# Patient Record
Sex: MALE | Race: BLACK OR AFRICAN AMERICAN | NOT HISPANIC OR LATINO | Employment: UNEMPLOYED | URBAN - METROPOLITAN AREA
[De-identification: names, ages, dates, MRNs, and addresses within clinical notes are randomized per-mention and may not be internally consistent; named-entity substitution may affect disease eponyms.]

---

## 2018-12-13 ENCOUNTER — TELEPHONE (OUTPATIENT)
Dept: NEUROLOGY | Facility: HOSPITAL | Age: 58
End: 2018-12-13

## 2018-12-20 ENCOUNTER — TELEPHONE (OUTPATIENT)
Dept: NEUROLOGY | Facility: HOSPITAL | Age: 58
End: 2018-12-20

## 2018-12-26 ENCOUNTER — TELEPHONE (OUTPATIENT)
Dept: NEUROLOGY | Facility: HOSPITAL | Age: 58
End: 2018-12-26

## 2019-01-14 ENCOUNTER — TELEPHONE (OUTPATIENT)
Dept: NEUROLOGY | Facility: HOSPITAL | Age: 59
End: 2019-01-14

## 2019-01-16 ENCOUNTER — TELEPHONE (OUTPATIENT)
Dept: NEUROLOGY | Facility: HOSPITAL | Age: 59
End: 2019-01-16

## 2019-01-16 NOTE — TELEPHONE ENCOUNTER
Pt unable to schedule clinic appt on Wednesday and procedure on following Thursday.  Pt cannot afford to stay in hotel overnight. Pt willing to make two trips to [place]. Pt request clinic appt and will schedule procedure after visit.  Clinic appt scheduled with pt on Monday, January 28, 2019 at 2pm.  Pt given clinic address and telephone number.  Pt and friend repeated date, time and address correctly.

## 2019-01-28 ENCOUNTER — OFFICE VISIT (OUTPATIENT)
Dept: NEUROLOGY | Facility: HOSPITAL | Age: 59
End: 2019-01-28
Attending: INTERNAL MEDICINE
Payer: MEDICAID

## 2019-01-28 VITALS
TEMPERATURE: 97 F | DIASTOLIC BLOOD PRESSURE: 74 MMHG | BODY MASS INDEX: 24.95 KG/M2 | SYSTOLIC BLOOD PRESSURE: 135 MMHG | WEIGHT: 178.25 LBS | HEART RATE: 78 BPM | HEIGHT: 71 IN

## 2019-01-28 DIAGNOSIS — D50.0 CHRONIC BLOOD LOSS ANEMIA: Primary | ICD-10-CM

## 2019-01-28 PROCEDURE — 99214 OFFICE O/P EST MOD 30 MIN: CPT | Performed by: INTERNAL MEDICINE

## 2019-01-28 RX ORDER — DIGOXIN 125 MCG
125 TABLET ORAL DAILY
COMMUNITY

## 2019-01-28 RX ORDER — LANOLIN ALCOHOL/MO/W.PET/CERES
100 CREAM (GRAM) TOPICAL
COMMUNITY
End: 2020-01-16 | Stop reason: ALTCHOICE

## 2019-01-28 RX ORDER — SPIRONOLACTONE 25 MG/1
25 TABLET ORAL DAILY
Status: ON HOLD | COMMUNITY
End: 2023-10-02 | Stop reason: HOSPADM

## 2019-01-28 RX ORDER — FERROUS SULFATE 325(65) MG
325 TABLET ORAL 2 TIMES DAILY
Status: ON HOLD | COMMUNITY
End: 2023-10-02 | Stop reason: HOSPADM

## 2019-01-28 RX ORDER — SUCRALFATE 1 G/1
1 TABLET ORAL 2 TIMES DAILY
Status: ON HOLD | COMMUNITY
End: 2023-10-02 | Stop reason: HOSPADM

## 2019-01-28 RX ORDER — FUROSEMIDE 40 MG/1
40 TABLET ORAL 2 TIMES DAILY
Status: ON HOLD | COMMUNITY
End: 2023-10-02 | Stop reason: SDUPTHER

## 2019-01-28 NOTE — PATIENT INSTRUCTIONS
You are scheduled for an Upper SBE on _Monday, February 4, 2019________________________________    You should eat light meals the day before the procedure and nothing to eat or drink after midnight the night before your procedure.    You will need to be at the 1st floor admission desk at the hospital on _7am arrival time._______________________
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
## 2019-01-31 ENCOUNTER — TELEPHONE (OUTPATIENT)
Dept: ENDOSCOPY | Facility: HOSPITAL | Age: 59
End: 2019-01-31

## 2019-02-01 ENCOUNTER — TELEPHONE (OUTPATIENT)
Dept: ENDOSCOPY | Facility: HOSPITAL | Age: 59
End: 2019-02-01

## 2019-02-01 NOTE — TELEPHONE ENCOUNTER
Spoke with patient about arrival time @ 0700*.     NPO status reviewed: Patient must have nothing to eat after midnight.      Medications: Do not take Insulin or oral diabetic medications the day of the procedure.  Take as prescribed: heart, seizure and blood pressure medication in the morning with a sip of water (less than an ounce).  Take any breathing medications and bring inhalers to hospital with you Leave all valuables and jewelry at home.     Wear comfortable clothes to procedure to change into hospital gown You cannot drive for 24 hours after your procedure because you will receive sedation for your procedure to make you comfortable.  A ride must be provided at discharge.

## 2019-02-04 ENCOUNTER — ANESTHESIA (OUTPATIENT)
Dept: ENDOSCOPY | Facility: HOSPITAL | Age: 59
End: 2019-02-04
Payer: MEDICAID

## 2019-02-04 ENCOUNTER — ANESTHESIA EVENT (OUTPATIENT)
Dept: ENDOSCOPY | Facility: HOSPITAL | Age: 59
End: 2019-02-04
Payer: MEDICAID

## 2019-02-04 ENCOUNTER — HOSPITAL ENCOUNTER (OUTPATIENT)
Facility: HOSPITAL | Age: 59
Discharge: HOME | End: 2019-02-04
Attending: INTERNAL MEDICINE | Admitting: INTERNAL MEDICINE
Payer: MEDICAID

## 2019-02-04 VITALS
WEIGHT: 176 LBS | RESPIRATION RATE: 20 BRPM | OXYGEN SATURATION: 100 % | HEIGHT: 71 IN | HEART RATE: 79 BPM | SYSTOLIC BLOOD PRESSURE: 123 MMHG | BODY MASS INDEX: 24.64 KG/M2 | DIASTOLIC BLOOD PRESSURE: 59 MMHG | TEMPERATURE: 98 F

## 2019-02-04 DIAGNOSIS — D64.9 ANEMIA, UNSPECIFIED TYPE: Primary | ICD-10-CM

## 2019-02-04 DIAGNOSIS — D64.9 ANEMIA: ICD-10-CM

## 2019-02-04 DIAGNOSIS — D50.0 ANEMIA DUE TO CHRONIC BLOOD LOSS: ICD-10-CM

## 2019-02-04 DIAGNOSIS — D51.0 PERNICIOUS ANEMIA: ICD-10-CM

## 2019-02-04 LAB
ALBUMIN SERPL BCP-MCNC: 3.5 G/DL
ALP SERPL-CCNC: 77 U/L
ALT SERPL W/O P-5'-P-CCNC: 44 U/L
ANION GAP SERPL CALC-SCNC: 7 MMOL/L
ANISOCYTOSIS BLD QL SMEAR: SLIGHT
AST SERPL-CCNC: 57 U/L
BASOPHILS # BLD AUTO: ABNORMAL K/UL
BASOPHILS NFR BLD: 0 %
BILIRUB SERPL-MCNC: 0.4 MG/DL
BUN SERPL-MCNC: 11 MG/DL
CALCIUM SERPL-MCNC: 8.6 MG/DL
CHLORIDE SERPL-SCNC: 110 MMOL/L
CO2 SERPL-SCNC: 24 MMOL/L
CREAT SERPL-MCNC: 1.2 MG/DL
DIFFERENTIAL METHOD: ABNORMAL
EOSINOPHIL # BLD AUTO: ABNORMAL K/UL
EOSINOPHIL NFR BLD: 1 %
ERYTHROCYTE [DISTWIDTH] IN BLOOD BY AUTOMATED COUNT: 15.7 %
EST. GFR  (AFRICAN AMERICAN): >60 ML/MIN/1.73 M^2
EST. GFR  (NON AFRICAN AMERICAN): >60 ML/MIN/1.73 M^2
FERRITIN SERPL-MCNC: 62 NG/ML
GLUCOSE SERPL-MCNC: 147 MG/DL
HCT VFR BLD AUTO: 33.2 %
HGB BLD-MCNC: 10 G/DL
HYPOCHROMIA BLD QL SMEAR: ABNORMAL
IRON SERPL-MCNC: 45 UG/DL
LYMPHOCYTES # BLD AUTO: ABNORMAL K/UL
LYMPHOCYTES NFR BLD: 27 %
MAGNESIUM SERPL-MCNC: 2.1 MG/DL
MCH RBC QN AUTO: 28.5 PG
MCHC RBC AUTO-ENTMCNC: 30.1 G/DL
MCV RBC AUTO: 95 FL
MONOCYTES # BLD AUTO: ABNORMAL K/UL
MONOCYTES NFR BLD: 8 %
NEUTROPHILS NFR BLD: 64 %
OVALOCYTES BLD QL SMEAR: ABNORMAL
PLATELET # BLD AUTO: 57 K/UL
PLATELET BLD QL SMEAR: ABNORMAL
PMV BLD AUTO: ABNORMAL FL
POIKILOCYTOSIS BLD QL SMEAR: SLIGHT
POTASSIUM SERPL-SCNC: 4.3 MMOL/L
PROT SERPL-MCNC: 6.7 G/DL
RBC # BLD AUTO: 3.51 M/UL
SATURATED IRON: 10 %
SODIUM SERPL-SCNC: 141 MMOL/L
TOTAL IRON BINDING CAPACITY: 450 UG/DL
TRANSFERRIN SERPL-MCNC: 304 MG/DL
WBC # BLD AUTO: 1.81 K/UL

## 2019-02-04 PROCEDURE — 25000003 PHARM REV CODE 250: Performed by: INTERNAL MEDICINE

## 2019-02-04 PROCEDURE — 25000242 PHARM REV CODE 250 ALT 637 W/ HCPCS: Performed by: ANESTHESIOLOGY

## 2019-02-04 PROCEDURE — 37000008 HC ANESTHESIA 1ST 15 MINUTES: Performed by: INTERNAL MEDICINE

## 2019-02-04 PROCEDURE — 82728 ASSAY OF FERRITIN: CPT

## 2019-02-04 PROCEDURE — 36415 COLL VENOUS BLD VENIPUNCTURE: CPT

## 2019-02-04 PROCEDURE — 88342 TISSUE SPECIMEN TO PATHOLOGY - SURGERY: ICD-10-PCS | Mod: 26,,, | Performed by: PATHOLOGY

## 2019-02-04 PROCEDURE — 83540 ASSAY OF IRON: CPT

## 2019-02-04 PROCEDURE — 44378 SMALL BOWEL ENDOSCOPY: CPT | Performed by: INTERNAL MEDICINE

## 2019-02-04 PROCEDURE — 00731 ANES UPR GI NDSC PX NOS: CPT | Performed by: INTERNAL MEDICINE

## 2019-02-04 PROCEDURE — 27000221 HC OXYGEN, UP TO 24 HOURS

## 2019-02-04 PROCEDURE — 27202087 HC PROBE, APC: Performed by: INTERNAL MEDICINE

## 2019-02-04 PROCEDURE — 83735 ASSAY OF MAGNESIUM: CPT

## 2019-02-04 PROCEDURE — 37000009 HC ANESTHESIA EA ADD 15 MINS: Performed by: INTERNAL MEDICINE

## 2019-02-04 PROCEDURE — 94640 AIRWAY INHALATION TREATMENT: CPT | Mod: 59

## 2019-02-04 PROCEDURE — 44361 SMALL BOWEL ENDOSCOPY/BIOPSY: CPT | Performed by: INTERNAL MEDICINE

## 2019-02-04 PROCEDURE — 88305 TISSUE SPECIMEN TO PATHOLOGY - SURGERY: ICD-10-PCS | Mod: 26,,, | Performed by: PATHOLOGY

## 2019-02-04 PROCEDURE — 88342 IMHCHEM/IMCYTCHM 1ST ANTB: CPT | Mod: 26,,, | Performed by: PATHOLOGY

## 2019-02-04 PROCEDURE — 85027 COMPLETE CBC AUTOMATED: CPT

## 2019-02-04 PROCEDURE — 85007 BL SMEAR W/DIFF WBC COUNT: CPT

## 2019-02-04 PROCEDURE — 80053 COMPREHEN METABOLIC PANEL: CPT

## 2019-02-04 PROCEDURE — 88305 TISSUE EXAM BY PATHOLOGIST: CPT | Performed by: PATHOLOGY

## 2019-02-04 PROCEDURE — 27201012 HC FORCEPS, HOT/COLD, DISP: Performed by: INTERNAL MEDICINE

## 2019-02-04 PROCEDURE — 63600175 PHARM REV CODE 636 W HCPCS: Performed by: NURSE ANESTHETIST, CERTIFIED REGISTERED

## 2019-02-04 PROCEDURE — 27201238 HC BALLOON, OVERTUBE (ANY): Performed by: INTERNAL MEDICINE

## 2019-02-04 PROCEDURE — 88305 TISSUE EXAM BY PATHOLOGIST: CPT | Mod: 26,,, | Performed by: PATHOLOGY

## 2019-02-04 RX ORDER — SUCCINYLCHOLINE CHLORIDE 20 MG/ML
INJECTION INTRAMUSCULAR; INTRAVENOUS
Status: DISCONTINUED | OUTPATIENT
Start: 2019-02-04 | End: 2019-02-04

## 2019-02-04 RX ORDER — PROPOFOL 10 MG/ML
VIAL (ML) INTRAVENOUS
Status: DISCONTINUED | OUTPATIENT
Start: 2019-02-04 | End: 2019-02-04

## 2019-02-04 RX ORDER — IPRATROPIUM BROMIDE AND ALBUTEROL SULFATE 2.5; .5 MG/3ML; MG/3ML
3 SOLUTION RESPIRATORY (INHALATION) ONCE
Status: COMPLETED | OUTPATIENT
Start: 2019-02-04 | End: 2019-02-04

## 2019-02-04 RX ORDER — SODIUM CHLORIDE 0.9 % (FLUSH) 0.9 %
3 SYRINGE (ML) INJECTION EVERY 8 HOURS
Status: DISCONTINUED | OUTPATIENT
Start: 2019-02-04 | End: 2019-02-04 | Stop reason: HOSPADM

## 2019-02-04 RX ORDER — SODIUM CHLORIDE 0.9 % (FLUSH) 0.9 %
3 SYRINGE (ML) INJECTION
Status: DISCONTINUED | OUTPATIENT
Start: 2019-02-04 | End: 2019-02-04 | Stop reason: HOSPADM

## 2019-02-04 RX ORDER — HYDROMORPHONE HYDROCHLORIDE 2 MG/ML
0.5 INJECTION, SOLUTION INTRAMUSCULAR; INTRAVENOUS; SUBCUTANEOUS EVERY 5 MIN PRN
Status: DISCONTINUED | OUTPATIENT
Start: 2019-02-04 | End: 2019-02-04 | Stop reason: HOSPADM

## 2019-02-04 RX ORDER — LIDOCAINE HCL/PF 100 MG/5ML
SYRINGE (ML) INTRAVENOUS
Status: DISCONTINUED | OUTPATIENT
Start: 2019-02-04 | End: 2019-02-04

## 2019-02-04 RX ORDER — IPRATROPIUM BROMIDE AND ALBUTEROL SULFATE 2.5; .5 MG/3ML; MG/3ML
SOLUTION RESPIRATORY (INHALATION)
Status: DISCONTINUED
Start: 2019-02-04 | End: 2019-02-04 | Stop reason: HOSPADM

## 2019-02-04 RX ORDER — OXYCODONE HYDROCHLORIDE 5 MG/1
5 TABLET ORAL
Status: DISCONTINUED | OUTPATIENT
Start: 2019-02-04 | End: 2019-02-04 | Stop reason: HOSPADM

## 2019-02-04 RX ORDER — FENTANYL CITRATE 50 UG/ML
INJECTION, SOLUTION INTRAMUSCULAR; INTRAVENOUS
Status: DISCONTINUED | OUTPATIENT
Start: 2019-02-04 | End: 2019-02-04

## 2019-02-04 RX ORDER — SODIUM CHLORIDE 9 MG/ML
INJECTION, SOLUTION INTRAVENOUS CONTINUOUS
Status: DISCONTINUED | OUTPATIENT
Start: 2019-02-04 | End: 2019-02-04 | Stop reason: HOSPADM

## 2019-02-04 RX ORDER — DIPHENHYDRAMINE HYDROCHLORIDE 50 MG/ML
25 INJECTION INTRAMUSCULAR; INTRAVENOUS EVERY 6 HOURS PRN
Status: DISCONTINUED | OUTPATIENT
Start: 2019-02-04 | End: 2019-02-04 | Stop reason: HOSPADM

## 2019-02-04 RX ORDER — MIDAZOLAM HYDROCHLORIDE 1 MG/ML
INJECTION INTRAMUSCULAR; INTRAVENOUS
Status: DISCONTINUED | OUTPATIENT
Start: 2019-02-04 | End: 2019-02-04

## 2019-02-04 RX ORDER — ONDANSETRON HYDROCHLORIDE 2 MG/ML
INJECTION, SOLUTION INTRAMUSCULAR; INTRAVENOUS
Status: DISCONTINUED | OUTPATIENT
Start: 2019-02-04 | End: 2019-02-04

## 2019-02-04 RX ORDER — ONDANSETRON 2 MG/ML
4 INJECTION INTRAMUSCULAR; INTRAVENOUS DAILY PRN
Status: DISCONTINUED | OUTPATIENT
Start: 2019-02-04 | End: 2019-02-04 | Stop reason: HOSPADM

## 2019-02-04 RX ADMIN — FENTANYL CITRATE 100 MCG: 50 INJECTION, SOLUTION INTRAMUSCULAR; INTRAVENOUS at 08:02

## 2019-02-04 RX ADMIN — SODIUM CHLORIDE: 0.9 INJECTION, SOLUTION INTRAVENOUS at 07:02

## 2019-02-04 RX ADMIN — IPRATROPIUM BROMIDE AND ALBUTEROL SULFATE 3 ML: .5; 3 SOLUTION RESPIRATORY (INHALATION) at 09:02

## 2019-02-04 RX ADMIN — PROPOFOL 150 MG: 10 INJECTION, EMULSION INTRAVENOUS at 08:02

## 2019-02-04 RX ADMIN — ONDANSETRON 8 MG: 2 INJECTION, SOLUTION INTRAMUSCULAR; INTRAVENOUS at 08:02

## 2019-02-04 RX ADMIN — SUCCINYLCHOLINE CHLORIDE 160 MG: 20 INJECTION, SOLUTION INTRAMUSCULAR; INTRAVENOUS at 08:02

## 2019-02-04 RX ADMIN — MIDAZOLAM HYDROCHLORIDE 2 MG: 1 INJECTION, SOLUTION INTRAMUSCULAR; INTRAVENOUS at 08:02

## 2019-02-04 RX ADMIN — LIDOCAINE HYDROCHLORIDE 100 MG: 20 INJECTION, SOLUTION INTRAVENOUS at 08:02

## 2019-02-04 NOTE — ANESTHESIA RELEASE NOTE
"Anesthesia Release from PACU Note    Patient: Jose Rafael Duran    Procedure(s) Performed: Procedure(s) (LRB):  ENTEROSCOPY, PROXIMAL -- upper single balloon (N/A)    Anesthesia type: general    Post pain: Adequate analgesia    Post assessment: no apparent anesthetic complications    Last Vitals:   Visit Vitals  BP (!) 123/59   Pulse 79   Temp 36.7 °C (98.1 °F)   Resp 20   Ht 5' 11" (1.803 m)   Wt 79.8 kg (176 lb)   SpO2 100%   BMI 24.55 kg/m²       Post vital signs: stable    Level of consciousness: awake    Nausea/Vomiting: no nausea/no vomiting    Complications: none    Airway Patency: patent    Respiratory: unassisted, spontaneous ventilation    Cardiovascular: stable and blood pressure at baseline    Hydration: euvolemic  "

## 2019-02-04 NOTE — ANESTHESIA POSTPROCEDURE EVALUATION
"Anesthesia Post Evaluation    Patient: Jose Rafael Duran    Procedure(s) Performed: Procedure(s) (LRB):  ENTEROSCOPY, PROXIMAL -- upper single balloon (N/A)    Final Anesthesia Type: general  Patient location during evaluation: PACU  Patient participation: Yes- Able to Participate  Level of consciousness: awake  Post-procedure vital signs: reviewed and stable  Pain management: adequate  Airway patency: patent  PONV status at discharge: No PONV  Anesthetic complications: no      Cardiovascular status: stable  Respiratory status: unassisted  Hydration status: euvolemic  Follow-up not needed.        Visit Vitals  BP (!) 123/59   Pulse 79   Temp 36.7 °C (98.1 °F)   Resp 20   Ht 5' 11" (1.803 m)   Wt 79.8 kg (176 lb)   SpO2 100%   BMI 24.55 kg/m²       Pain/Dayna Score: Dayna Score: 10 (2/4/2019 11:00 AM)        "

## 2019-02-04 NOTE — PLAN OF CARE
Asked to see Mr. NEW Duran due to observed abnormal ventricular beats during endoscopy.  Review of strips available does not show concerning arrhythmia.  Patient has a history of valve replacement surgery.  He has a cardiologist in [place] with a follow up appointment in March 2019.  At this point, he has no angina or dyspnea.  Telemetry shows sinus rhythm at 95 bpm.    --  No cardiac work up is needed at this time during the current hospitalization.  --  Follow up with cardiologist as scheduled.  --  Resume home medications today when tolerating po.    Tesfaye Lopez MD

## 2019-02-04 NOTE — ANESTHESIA PREPROCEDURE EVALUATION
02/04/2019  Jose Rafael Duran is a 58 y.o., male. SBE    Review of patient's allergies indicates:  No Known Allergies    Past Medical History:   Diagnosis Date    Anemia     Arthritis     Cataract     CHF (congestive heart failure)     Deep vein thrombosis     Encounter for blood transfusion     Heart murmur     Hypertension      Past Surgical History:   Procedure Laterality Date    CORONARY ARTERY BYPASS GRAFT       There is no problem list on file for this patient.      Wt Readings from Last 3 Encounters:   01/28/19 80.8 kg (178 lb 3.9 oz)     Temp Readings from Last 3 Encounters:   01/28/19 36.2 °C (97.1 °F) (Oral)     BP Readings from Last 3 Encounters:   01/28/19 135/74     Pulse Readings from Last 3 Encounters:   01/28/19 78       Anesthesia Evaluation    I have reviewed the Patient Summary Reports.        Review of Systems      Physical Exam  General:  Well nourished, Obesity    Airway/Jaw/Neck:  Airway Findings: Mouth Opening: Normal Tongue: Normal  General Airway Assessment: Adult  Mallampati: II  Improves to II with phonation.  TM Distance: Normal, at least 6 cm       Chest/Lungs:  Chest/Lungs Findings: Clear to auscultation, Normal Respiratory Rate     Heart/Vascular:  Heart Findings: Rate: Normal  Rhythm: Regular Rhythm  Sounds: Normal        Mental Status:  Mental Status Findings:  Cooperative, Alert and Oriented       No results found for: WBC, HGB, HCT, MCV, PLT    Chemistry    No results found for: NA, K, CL, CO2, BUN, CREATININE, GLU No results found for: CALCIUM, ALKPHOS, AST, ALT, BILITOT, ESTGFRAFRICA, EGFRNONAA         Anesthesia Plan  Type of Anesthesia, risks & benefits discussed:  Anesthesia Type:  MAC  Patient's Preference:   Intra-op Monitoring Plan:   Intra-op Monitoring Plan Comments:   Post Op Pain Control Plan:   Post Op Pain Control Plan Comments:   Induction:   IV  Beta  Blocker:         Informed Consent: Patient understands risks and agrees with Anesthesia plan.  Questions answered. Anesthesia consent signed with patient.  ASA Score: 2     Day of Surgery Review of History & Physical: I have interviewed and examined the patient. I have reviewed the patient's H&P dated:  There are no significant changes.  H&P update referred to the provider.  H&P completed by Anesthesiologist.       Ready For Surgery From Anesthesia Perspective.

## 2019-02-04 NOTE — PLAN OF CARE
Reva out from pacu per Dr Corcoran. Report called to Maggi White/GI. Transported to endoscopy via stretcher,sr upx2.
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
## 2019-02-04 NOTE — PLAN OF CARE
Recovery complete. Patient recovered to baseline. Discharge instructions reviewed with patient. VSS. Dr Richard seen the blood result.  Patient can go home per Dr Richard.

## 2019-02-04 NOTE — TRANSFER OF CARE
"Anesthesia Transfer of Care Note    Patient: Jose Rafael Duran    Procedure(s) Performed: Procedure(s) (LRB):  ENTEROSCOPY, PROXIMAL -- upper single balloon (N/A)    Patient location: PACU    Anesthesia Type: general    Transport from OR: Transported from OR on 6-10 L/min O2 by face mask with adequate spontaneous ventilation    Post pain: adequate analgesia    Post assessment: no apparent anesthetic complications and tolerated procedure well    Post vital signs: stable    Level of consciousness: awake, alert and oriented    Nausea/Vomiting: no nausea/vomiting    Complications: none    Transfer of care protocol was followed      Last vitals:   Visit Vitals  BP (!) 147/70   Pulse 64   Temp 36.4 °C (97.5 °F)   Resp 18   Ht 5' 11" (1.803 m)   Wt 79.8 kg (176 lb)   SpO2 99%   BMI 24.55 kg/m²     "

## 2019-02-04 NOTE — [employer] PATIENT INSTRUCTIONS
Discharge Summary/Instructions after an Endoscopic Procedure  Patient Name: Jose Rafael Duran  Patient MRN: [id number]  Patient YOB: 1960  Monday, February 04, 2019  Rafa Richard MD  RESTRICTIONS:  During your procedure today, you received medications for sedation.  These   medications may affect your judgment, balance and coordination.  Therefore,   for 24 hours, you have the following restrictions:   - DO NOT drive a car, operate machinery, make legal/financial decisions,   sign important papers or drink alcohol.    ACTIVITY:  Today: no heavy lifting, straining or running due to procedural   sedation/anesthesia.  The following day: return to full activity including work.  DIET:  Eat and drink normally unless instructed otherwise.     TREATMENT FOR COMMON SIDE EFFECTS:  - Mild abdominal pain, nausea, belching, bloating or excessive gas:  rest,   eat lightly and use a heating pad.  - Sore Throat: treat with throat lozenges and/or gargle with warm salt   water.  - Because air was used during the procedure, expelling large amounts of air   from your rectum or belching is normal.  - If a bowel prep was taken, you may not have a bowel movement for 1-3 days.    This is normal.  SYMPTOMS TO WATCH FOR AND REPORT TO YOUR PHYSICIAN:  1. Abdominal pain or bloating, other than gas cramps.  2. Chest pain.  3. Back pain.  4. Signs of infection such as: chills or fever occurring within 24 hours   after the procedure.  5. Rectal bleeding, which would show as bright red, maroon, or black stools.   (A tablespoon of blood from the rectum is not serious, especially if   hemorrhoids are present.)  6. Vomiting.  7. Weakness or dizziness.  GO DIRECTLY TO THE NEAREST EMERGENCY ROOM IF YOU HAVE ANY OF THE FOLLOWING:      Difficulty breathing              Chills and/or fever over 101 F   Persistent vomiting and/or vomiting blood   Severe abdominal pain   Severe chest pain   Black, tarry stools   Bleeding- more than one  tablespoon   Any other symptom or condition that you feel may need urgent attention  Your doctor recommends these additional instructions:  If any biopsies were taken, your doctors clinic will contact you in 1 to 2   weeks with any results.  - Check Ferritin, Iron/TIBC today.  - Continue oral iron supplementation and he will likely benefit from IV iron   supplementation intermittently as an adjuct therapy.   - If progressive or persistent anemia despite these therapies, would   consider Octreotide 20mg LAR monthly. If still no benefit would plan to   repeat enteroscopy by upper DBE followed by trials of Thalidomide 50mg bid   vs tranexamic acid 650mg bid.   - Discharge patient to home (ambulatory).   - Resume previous diet.   - Continue present medications.   - Condition stable   - Resume previous diet   - The signs and symptoms of potential delayed complications were discussed   with the patient. If signs or symptoms of these complications develop, call   the [facility] at 1 [phone].   - Return to normal activities tomorrow.  Written discharge instructions were   provided to the patient.  For questions, problems or results please call your physician - Rafa Richard MD at Work:  [phone].  EMERGENCY PHONE NUMBER: [phone],  LAB RESULTS: [phone]  IF A COMPLICATION OR EMERGENCY SITUATION ARISES AND YOU ARE UNABLE TO REACH   YOUR PHYSICIAN - GO DIRECTLY TO THE EMERGENCY ROOM.  MD Rafa Morrell MD  2/4/2019 10:25:48 AM  This report has been verified and signed electronically.  [employer]

## 2019-12-11 PROBLEM — E87.6 HYPOKALEMIA: Status: ACTIVE | Noted: 2017-12-04

## 2019-12-11 PROBLEM — S52.022A: Status: ACTIVE | Noted: 2019-12-11

## 2019-12-11 PROBLEM — F14.11 HISTORY OF COCAINE ABUSE: Status: ACTIVE | Noted: 2019-12-11

## 2019-12-11 PROBLEM — K55.20 AVM (ARTERIOVENOUS MALFORMATION) OF SMALL BOWEL, ACQUIRED: Status: ACTIVE | Noted: 2017-09-12

## 2019-12-11 PROBLEM — K29.40 ATROPHIC GASTRITIS WITHOUT HEMORRHAGE: Status: ACTIVE | Noted: 2018-02-19

## 2019-12-11 PROBLEM — K21.9 GASTROESOPHAGEAL REFLUX DISEASE: Status: ACTIVE | Noted: 2019-12-11

## 2019-12-11 PROBLEM — I50.20 SYSTOLIC HEART FAILURE: Status: ACTIVE | Noted: 2017-12-02

## 2019-12-11 PROBLEM — R74.8 CARDIAC ENZYMES ELEVATED: Status: ACTIVE | Noted: 2019-12-11

## 2019-12-11 PROBLEM — M79.604 PAIN IN BOTH LOWER EXTREMITIES: Status: ACTIVE | Noted: 2017-12-06

## 2019-12-11 PROBLEM — D68.9 COAGULOPATHY: Status: ACTIVE | Noted: 2018-02-13

## 2019-12-11 PROBLEM — I35.0 AORTIC STENOSIS, MODERATE: Status: ACTIVE | Noted: 2019-12-11

## 2019-12-11 PROBLEM — K62.5 RECTAL BLEEDING: Status: ACTIVE | Noted: 2018-12-05

## 2019-12-11 PROBLEM — I85.10 SECONDARY ESOPHAGEAL VARICES WITHOUT BLEEDING: Status: ACTIVE | Noted: 2019-12-11

## 2019-12-11 PROBLEM — M79.605 PAIN IN BOTH LOWER EXTREMITIES: Status: ACTIVE | Noted: 2017-12-06

## 2019-12-11 PROBLEM — E16.2 HYPOGLYCEMIA: Status: ACTIVE | Noted: 2017-12-02

## 2019-12-11 PROBLEM — K20.90 ESOPHAGITIS DETERMINED BY ENDOSCOPY: Status: ACTIVE | Noted: 2018-02-19

## 2019-12-13 PROBLEM — Z87.74 HISTORY OF ARTERIOVENOUS MALFORMATION (AVM): Status: ACTIVE | Noted: 2019-04-17

## 2019-12-13 PROBLEM — J44.1 CHRONIC OBSTRUCTIVE PULMONARY DISEASE WITH ACUTE EXACERBATION: Status: ACTIVE | Noted: 2019-09-19

## 2019-12-13 PROBLEM — R79.89 ELEVATED BRAIN NATRIURETIC PEPTIDE (BNP) LEVEL: Status: ACTIVE | Noted: 2019-04-17

## 2019-12-13 PROBLEM — I36.1 NON-RHEUMATIC TRICUSPID VALVE INSUFFICIENCY: Status: ACTIVE | Noted: 2019-09-18

## 2019-12-13 PROBLEM — M25.522 LEFT ELBOW PAIN: Status: ACTIVE | Noted: 2019-12-13

## 2019-12-17 PROBLEM — F19.10 MULTIPLE SUBSTANCE ABUSE: Status: ACTIVE | Noted: 2019-12-17

## 2019-12-17 PROBLEM — K62.5 RECTAL BLEEDING: Status: RESOLVED | Noted: 2018-12-05 | Resolved: 2019-12-17

## 2019-12-17 PROBLEM — K29.40 ATROPHIC GASTRITIS WITHOUT HEMORRHAGE: Status: RESOLVED | Noted: 2018-02-19 | Resolved: 2019-12-17

## 2019-12-17 PROBLEM — R79.89 ELEVATED BRAIN NATRIURETIC PEPTIDE (BNP) LEVEL: Status: RESOLVED | Noted: 2019-04-17 | Resolved: 2019-12-17

## 2019-12-17 PROBLEM — R74.8 CARDIAC ENZYMES ELEVATED: Status: RESOLVED | Noted: 2019-12-11 | Resolved: 2019-12-17

## 2019-12-17 PROBLEM — I85.11 SECONDARY ESOPHAGEAL VARICES WITH BLEEDING: Status: ACTIVE | Noted: 2019-12-17

## 2019-12-17 PROBLEM — I35.0 AORTIC STENOSIS, MODERATE: Status: RESOLVED | Noted: 2019-12-11 | Resolved: 2019-12-17

## 2019-12-17 PROBLEM — D68.9 COAGULOPATHY: Status: RESOLVED | Noted: 2018-02-13 | Resolved: 2019-12-17

## 2019-12-17 PROBLEM — E16.2 HYPOGLYCEMIA: Status: RESOLVED | Noted: 2017-12-02 | Resolved: 2019-12-17

## 2019-12-17 PROBLEM — M79.605 PAIN IN BOTH LOWER EXTREMITIES: Status: RESOLVED | Noted: 2017-12-06 | Resolved: 2019-12-17

## 2019-12-17 PROBLEM — S52.032K: Status: ACTIVE | Noted: 2019-12-11

## 2019-12-17 PROBLEM — E87.6 HYPOKALEMIA: Status: RESOLVED | Noted: 2017-12-04 | Resolved: 2019-12-17

## 2019-12-17 PROBLEM — M79.604 PAIN IN BOTH LOWER EXTREMITIES: Status: RESOLVED | Noted: 2017-12-06 | Resolved: 2019-12-17

## 2019-12-23 PROBLEM — D72.819 LEUKOPENIA: Status: ACTIVE | Noted: 2019-12-23

## 2019-12-23 PROBLEM — Z53.9: Status: ACTIVE | Noted: 2019-12-23

## 2020-02-03 PROBLEM — S42.402A ELBOW FRACTURE, LEFT: Status: ACTIVE | Noted: 2020-02-03

## 2020-02-03 PROBLEM — I51.7 CARDIOMEGALY: Chronic | Status: ACTIVE | Noted: 2020-02-03

## 2020-12-01 ENCOUNTER — TELEPHONE (OUTPATIENT)
Dept: NEUROLOGY | Facility: HOSPITAL | Age: 60
End: 2020-12-01

## 2020-12-01 NOTE — TELEPHONE ENCOUNTER
Message placed on voicemail.  Attempt to schedule audio/virtual clinic appt from referral by Dr. Tomas.  Message placed on [employer].

## 2020-12-16 ENCOUNTER — OFFICE VISIT (OUTPATIENT)
Dept: NEUROLOGY | Facility: HOSPITAL | Age: 60
End: 2020-12-16
Attending: INTERNAL MEDICINE
Payer: MEDICAID

## 2020-12-16 VITALS
RESPIRATION RATE: 19 BRPM | HEIGHT: 71 IN | HEART RATE: 89 BPM | TEMPERATURE: 98 F | WEIGHT: 167 LBS | SYSTOLIC BLOOD PRESSURE: 117 MMHG | BODY MASS INDEX: 23.38 KG/M2 | DIASTOLIC BLOOD PRESSURE: 65 MMHG

## 2020-12-16 DIAGNOSIS — D62 ANEMIA ASSOCIATED WITH ACUTE BLOOD LOSS: Primary | ICD-10-CM

## 2020-12-16 PROCEDURE — 99214 OFFICE O/P EST MOD 30 MIN: CPT | Performed by: INTERNAL MEDICINE

## 2020-12-16 RX ORDER — OCTREOTIDE ACETATE,MI-SPHERES 20 MG
VIAL (EA) INTRAMUSCULAR
Status: ON HOLD | COMMUNITY
Start: 2020-12-14 | End: 2023-10-02 | Stop reason: HOSPADM

## 2020-12-16 NOTE — PROGRESS NOTES
[facility]    CC: anemia    HPI 60 y.o. male with chronic, severe, iron-deficient and chronic GI blood loss anemia; associated with recurrent need for blood transfusions and current medical therapies with monthly octreotide LAR and endoscopic ablations (outliend below); not associated with current overt blood in stools (though historically has noticed minor blood when wiping and on stools). Notes lower extremity cramping and fatigue easily with use. Recent hospitalization for severe anemia with blood transfusion at outside facility with push enteroscopy as noted:    10/24/20 push enteroscopy ([facility]):  Esophagus grossly normal but after small bowel enteroscopy there was some mucosal trauma in the distal esophagus but no continued bleeding, small hiatal hernia, normal stomach, we advanced the pediatric colonoscope to 110 cm and there were several AV malformations in the jejunum as well as one in the 2 in the duodenum that were ablated with APC with good results. In recovery stable. No acute complications.    2/4/2019 Upper SBE here:  Impression:             - Successful ablation of two classic angioectasias in the proximal jejunum   - Diffuse gastritis with punctate hemorrhage   - 58 year old male with transfusion dependent anemia requiring PRBC transfusions every 3 months with known history of angioectasias with previous ablation of angioectasias in the stomach. This patient's anemia is likely related chronic blood loss anemia from punctate gastric hemorrhage secondary to diffuse gastritis in addition to blood loss from angioectasias. A video capsule study is not covered by this patient's insurance.    Past Medical History  Past Medical History:   Diagnosis Date    Alcoholic cirrhosis 6/9/2014    Anemia     Anemia Hgb on 8.1 03 Feb 2020 2/4/2019    Aortic stenosis, moderate 12/11/2019    Arthritis     Atrophic gastritis without hemorrhage 2/19/2018    Bleeding nose 6/9/2014     Cardiac enzymes elevated 12/11/2019    Cardiomegaly Chest Xray 03 Feb 2020 2/3/2020    Cardiomyopathy EF 25 -30 % Post Op Aortic Valve Replacement 25 August 2014 4/5/2013    Last Assessment & Plan:  ECHO from 11/2/17 with EF 30-35%. Pt recently in hospital for A/C chf exacerbation with fluid overload and SOB. Since d/c home has felt better than has in a long time. Denies any SOB or CP. Denies any orthopnea or edema. On PE lungs are CTA. No edema. Will continue current medications including digoxin, lasix, aldactone, and entresto. Continue to monitor weight and fluid/s    Cataract     CHF (congestive heart failure)     Coagulopathy 2/13/2018    Cocaine abuse + UDS June 2012, March 2014, August 2014, January 2017, 13 Dec 2019 9/5/2013    Overview:  dx update    Cocaine abuse + UDS June 2012, March 2014, August 2014, January 2017, 13 Dec 2019, 04 Feb 2020 9/5/2013    Overview:  dx update    Deep vein thrombosis     Dental caries 4/22/2015    Elevated brain natriuretic peptide (BNP) level 4/17/2019    Encounter for blood transfusion     Gastrointestinal hemorrhage 9/9/2013    Overview:  dx update Overview:  Added automatically from request for surgery [id number]    Heart murmur     Hematochezia 9/9/2013    Hypertension     Hypoglycemia 12/2/2017    Hypokalemia 12/4/2017    Last Assessment & Plan:  Resolved. Will recheck labs in the morning.    Internal hemorrhoids 9/9/2013    Left Elbow Pain Onset 08 Dec 2019 12/13/2019    Leukopenia WBC 1.52 on 23 Dec 2019 12/23/2019    LT Elbow Olecranon Fx Closed Onset 08 Dec 2019 12/11/2019    Multiple substance abuse (Cocaine, Alcohol) 12/17/2019    Numbness 10/26/2016    Pain in both lower extremities 12/6/2017    Last Assessment & Plan:  Will add PRN Ultram. Stop Lipitor    Palpitation 8/15/2016    Last Assessment & Plan:  Secondary to elevated HR. Increase digoxin    Personal history of other specified conditions 9/5/2013    Last Assessment & Plan:  We  "will continue to avoid beta blockers.  Patient aware of importance of cessation.    Rectal bleeding 2018    Secondary esophageal varices with bleeding 2019    Shortness of breath 2016    Last Assessment & Plan:  Secondary to overt CHF exacerbation    Spondylosis of cervical spine 12/10/2013    Overview:  dx update    Surgery canceled (LT Elbow Olecranon Fx ORIF) due to WBC 1.52 Hgb 6.2 and decreased platelets 23 Dec 2019 2019    Thrombocytopenia 2013    Last Assessment & Plan:  We will continue to monitor closely while on Lovenox. Labs in am.    Thrombocytopenia 59,000 on 2013    Last Assessment & Plan:  We will continue to monitor closely while on Lovenox. Labs in am.       Review of Systems  General ROS: negative for chills, fever or weight loss  Cardiovascular ROS: no chest pain or dyspnea at rest  Gastrointestinal ROS: no abdominal pain, change in bowel habits, or black/ bloody stools    Physical Examination  /65 (BP Location: Right arm, Patient Position: Sitting, BP Method: Medium (Automatic))   Pulse 89   Temp 97.6 °F (36.4 °C) (Oral)   Resp 19   Ht 5' 11" (1.803 m)   Wt 75.8 kg (167 lb)   BMI 23.29 kg/m²   General appearance: alert, cooperative, no distress  HENT: Normocephalic, atraumatic, neck symmetrical, no nasal discharge   Lungs: clear to auscultation bilaterally, no dullness to percussion bilaterally  Heart: regular rate and rhythm without rub; no displacement of the PMI   Abdomen: soft, non-tender; bowel sounds normoactive; no organomegaly  Extremities: extremities symmetric; no clubbing, cyanosis, or edema  Neurologic: Alert and oriented X 3, normal strength, normal coordination and gait    Labs:  Lab Results   Component Value Date    WBC 2.12 (L) 2020    HGB 8.1 (L) 2020    HCT 28.0 (L) 2020    MCV 85 2020    PLT 59 (L) 2020     Ferritin 2.7 (10/23/20)    Imagin2019 Upper SBE images independently " reviewed; two classic angioectasias in proximal jejunum.    Assessment:   60 y.o. male patient with multiple cardiac valve replacements history of chronic CHANTAL secondary to GI blood loss from punctate gastric hemorrhage secondary to diffuse gastritis as well as gastric and jejunal angioectasias treated endoscopically. Appears he is also now on monthly octreotide depot injections. He has had blood transfusions but not yet had parenteral iron therapy as a treatment of his disease and his CHANTAL, as cannot mount an appropriate hematopoietic response if chronically deficient in iron.    Plan:  - Discussed with Froilan Laboy NP recommendation and planned arrangement there (patient in [place]) for course of Injectafer 750 mg x 2 doses (1 week apart) now and again in 3 months.  - Recheck iron studies and CBC after second course of parenteral iron in 3 months. If stable/improving, continue periodic iron infusions. Discussed three treatments underway for his disease: endoscopic ablation, octreotide depot, and now parenteral iron. If not improved or recurrent after courses of parenteral iron, would plan repeat enteroscopy for ablation of additional angioectasias.      Rafa Richard MD   [address] [place] [address], [address]   [place] [address] [phone]

## 2023-07-06 PROBLEM — D63.8 ANEMIA, CHRONIC DISEASE: Status: ACTIVE | Noted: 2023-07-06

## 2023-07-06 PROBLEM — R16.1 SPLENOMEGALY: Status: ACTIVE | Noted: 2023-07-06

## 2023-09-29 PROBLEM — E87.5 HYPERKALEMIA: Status: ACTIVE | Noted: 2023-09-29

## 2023-09-29 PROBLEM — E87.20 METABOLIC ACIDOSIS: Status: ACTIVE | Noted: 2023-09-29

## 2023-09-29 PROBLEM — N17.9 AKI (ACUTE KIDNEY INJURY): Status: ACTIVE | Noted: 2023-09-29

## 2023-09-29 PROBLEM — Z95.2 S/P AORTIC VALVE REPLACEMENT: Status: ACTIVE | Noted: 2023-09-29

## 2024-01-01 PROBLEM — N17.9 AKI (ACUTE KIDNEY INJURY): Status: RESOLVED | Noted: 2023-09-29 | Resolved: 2024-01-01

## 2024-02-10 PROBLEM — Z95.3 S/P TAVR (TRANSCATHETER AORTIC VALVE REPLACEMENT), BIOPROSTHETIC: Status: ACTIVE | Noted: 2023-09-29

## 2024-02-10 PROBLEM — K74.60 HEPATIC CIRRHOSIS: Status: ACTIVE | Noted: 2024-02-10

## 2024-02-11 PROBLEM — D62 ACUTE BLOOD LOSS ANEMIA: Status: ACTIVE | Noted: 2024-02-11

## 2024-02-13 PROBLEM — I99.8 ANGIECTASIA: Status: ACTIVE | Noted: 2024-02-13

## 2024-02-13 PROBLEM — D64.9 SYMPTOMATIC ANEMIA: Status: ACTIVE | Noted: 2024-02-13

## 2024-02-19 PROBLEM — R60.9 EDEMA: Status: ACTIVE | Noted: 2024-02-19

## 2024-06-14 ENCOUNTER — TELEPHONE (OUTPATIENT)
Dept: TRANSPLANT | Facility: CLINIC | Age: 64
End: 2024-06-14
Payer: MEDICAID

## 2024-06-14 NOTE — TELEPHONE ENCOUNTER
----- Message from Michaela Alonzo MD sent at 6/14/2024  2:04 PM CDT -----  Please schedule this patient with Dr. Henry for transplant evaluation.     Thank you  Michaela Alonzo

## 2024-06-14 NOTE — TELEPHONE ENCOUNTER
"Review of pt chart with MELD 11 HCV     As of today, pt currently in hospital [place] due to low H&H. Pt reports he was scoped ONLY EGD.   6/13/24  Transthoracic echo (TTE) complete   Mild left ventricular hypertrophy with adequate systolic left ventricular   function.  Ejection fraction is estimated at 50%   Moderate diastolic dysfunction   Right ventricle is mildly enlarged   With decreased systolic function   Biatrial enlargement   Bioprosthetic aortic valve with good function   ICD lead is noted in the right-sided chambers   Severe tricuspid regurgitation with systolic pulmonary pressure estimated   at 74 mmHg   No pericardial effusion   All Measurements         Pt with extensive cards hx. Pt reports "two holes in heart that no one will fix due to risk".   Pt reports cardiologist with [facility]   Drinks on the weekend with his friends. Drank last weekend. He has cut back. Wine coolers now,used to drink 3-4 pints of vodka QUIT 3 years ago.     Pt offered virtual appt but stated he does not do electronics "period"(per patient)  So virtual is not an option. He stated all his family is out of town and can't help.     Message to DR Henry and [facility] staff to schedule.      "

## 2024-06-17 ENCOUNTER — TELEPHONE (OUTPATIENT)
Dept: TRANSPLANT | Facility: CLINIC | Age: 64
End: 2024-06-17
Payer: MEDICAID

## 2024-06-17 NOTE — TELEPHONE ENCOUNTER
Pt to be seen in person.     Francine Parmar MA Samant, Hrishikesh V., MD; Bernie Gillis; Michaela Alonzo MD; Shima Arellano  The pt can not due virtual appointment due to no electronics.  Please have him scheduled for an in person in [place].  Thanks      ----- Message -----  From: Meng Henry MD  Sent: 6/15/2024   9:15 AM CDT  To: Bernie Gillis; Michaela Alonzo MD; *  Subject: RE: Referral for appt for txp eval              I prefer to see him as virtual visit first  Severe Pulmonary  HTN and significant valvular Heart disease